# Patient Record
Sex: MALE | Race: WHITE | Employment: FULL TIME | URBAN - METROPOLITAN AREA
[De-identification: names, ages, dates, MRNs, and addresses within clinical notes are randomized per-mention and may not be internally consistent; named-entity substitution may affect disease eponyms.]

---
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
## 2024-09-27 NOTE — CARE COORDINATION
10/31/2022  Social Work Discharge Planning:Discharge today. Home wit parents and no needs. Electronically signed by JHONY Matias on 10/31/2022 at 9:45 AM Home

## (undated) DEVICE — GLOVE ORTHO 8   MSG9480